# Patient Record
Sex: FEMALE | Race: BLACK OR AFRICAN AMERICAN
[De-identification: names, ages, dates, MRNs, and addresses within clinical notes are randomized per-mention and may not be internally consistent; named-entity substitution may affect disease eponyms.]

---

## 2021-05-19 ENCOUNTER — HOSPITAL ENCOUNTER (OUTPATIENT)
Dept: HOSPITAL 61 - RT | Age: 46
End: 2021-05-19
Attending: INTERNAL MEDICINE
Payer: COMMERCIAL

## 2021-05-19 DIAGNOSIS — G47.33: Primary | ICD-10-CM

## 2021-05-19 DIAGNOSIS — G47.34: ICD-10-CM

## 2021-05-19 PROCEDURE — 95811 POLYSOM 6/>YRS CPAP 4/> PARM: CPT

## 2021-05-19 PROCEDURE — 95810 POLYSOM 6/> YRS 4/> PARAM: CPT

## 2021-05-20 NOTE — SLEEP
DATE OF STUDY: 05/19/2021

SLEEP STUDY



ATTENDING PHYSICIAN:  Dr. Caraballo.



The patient is a 46-year-old who weighs 332 pounds with a BMI of 54.  The 

patient's Loraine score was 10.  The patient underwent split night study.



During the night study, the patient spent 409 minutes in bed and slept for 298 

minutes with a sleep efficiency of 73%.  Sleep latency was 57 minutes with a REM

latency of 141 minutes.  Sleep architecture showed normal stage I sleep, 

increased N2 sleep, increased N3 sleep and reduced REM sleep.



During the initial diagnostic portion of the study, the patient slept for 67 

minutes.  The patient had no obstructive mixed or central apneas, but 42 

hypopneas.  The patient's AHI was 38 per hour.  Supine or REM sleep was not 

seen.



No PLMs were observed.



EKG monitoring revealed an average heart rate of 92 beats per minute.  No 

sustained arrhythmias observed.



Nocturnal oximetry study revealed a mean oxygen saturation of 91% with a lowest 

of 80%.  39% time oxygen saturation remained between 80 and 89%.



The patient met the criteria for CPAP initiation.  It was started at 5 cm water 

and titrated up to 11 cm water.  At the final pressure, the patient slept for 80

minutes.  The patient had supine as well as REM sleep.  The patient's AHI was 

reduced to 3 per hour and oxygen saturation remained above 88% with one spot 

desaturation of 82%.



IMPRESSION:

1.  Severe obstructive sleep apnea at an AHI of 38 per hour.

2.  Nocturnal hypoxia secondary to obstructive sleep apnea, but resolved with 

CPAP.

3.  No clinically significant PLMS.



RECOMMENDATIONS:

1.  CPAP at 11 cm water completely eliminated the patient's sleep apnea and 

should be used on a nightly basis.

2.  Follow up in 4-6 weeks to assess compliance with CPAP and to document 

clinical improvement.

3.  Weight loss is strongly advised.

4.  Avoid CNS depressants.

5.  Cautioned regarding driving until symptoms of sleep apnea have resolved with

the use of CPAP.







AUK/SUM

DR: Yancy   DD: 05/20/2021 08:36

DT: 05/20/2021 08:55   TID: [id number]

CC:     ROSALBA CARABALLO MD

## 2022-01-14 ENCOUNTER — HOSPITAL ENCOUNTER (OUTPATIENT)
Dept: HOSPITAL 61 - KCIC MAMMO | Age: 47
End: 2022-01-14
Attending: FAMILY MEDICINE
Payer: COMMERCIAL

## 2022-01-14 DIAGNOSIS — Z12.31: Primary | ICD-10-CM

## 2022-01-14 PROCEDURE — 77067 SCR MAMMO BI INCL CAD: CPT

## 2022-01-24 NOTE — [facility]
Bilateral digital screening mammograms:



Reason for examination: Routine screening.



Comparison is made to previous studies dated back to 10/25/2016.



Interpretation is made with the benefit of CAD.



The skin and nipples show no abnormalities. No abnormal lymph nodes are seen. The breast parenchyma i
s predominantly fatty. (Breast density: Category A.) There are no dominant masses, suspicious calcifi
cations or architectural distortions. A few benign calcifications are present.



Impression:



No evidence of malignancy. Recommend routine screening.



BI-RADS Category 2: Benign.



"Our facility is accredited by the [employer] Mammography Program."



This patient's information has been entered into a reminder system for the patient to be notified wit
h the results of her examination and a target date for the next mammogram.



Electronically signed by: Skyla Corrigan MD (1/24/2022 10:16 AM) [id number]